# Patient Record
Sex: FEMALE | Race: WHITE
[De-identification: names, ages, dates, MRNs, and addresses within clinical notes are randomized per-mention and may not be internally consistent; named-entity substitution may affect disease eponyms.]

---

## 2019-02-11 ENCOUNTER — HOSPITAL ENCOUNTER (EMERGENCY)
Dept: HOSPITAL 90 - EDH | Age: 79
Discharge: HOME | End: 2019-02-11
Payer: MEDICARE

## 2019-02-11 DIAGNOSIS — Z79.4: ICD-10-CM

## 2019-02-11 DIAGNOSIS — E78.5: ICD-10-CM

## 2019-02-11 DIAGNOSIS — E11.9: ICD-10-CM

## 2019-02-11 DIAGNOSIS — Z95.1: ICD-10-CM

## 2019-02-11 DIAGNOSIS — I10: ICD-10-CM

## 2019-02-11 DIAGNOSIS — A08.4: Primary | ICD-10-CM

## 2019-02-11 DIAGNOSIS — I25.10: ICD-10-CM

## 2019-02-11 LAB
ALBUMIN SERPL-MCNC: 3.8 G/DL (ref 3.5–5)
ALT SERPL-CCNC: 19 U/L (ref 12–78)
AST SERPL-CCNC: 28 U/L (ref 10–37)
BASOPHILS NFR BLD AUTO: 0.7 % (ref 0–5)
BILIRUB SERPL-MCNC: 0.6 MG/DL (ref 0.2–1)
BUN SERPL-MCNC: 39 MG/DL (ref 7–18)
CHLORIDE SERPL-SCNC: 100 MMOL/L (ref 101–111)
CO2 SERPL-SCNC: 26 MMOL/L (ref 21–32)
CREAT SERPL-MCNC: 1.7 MG/DL (ref 0.5–1.5)
EOSINOPHIL NFR BLD AUTO: 1 % (ref 0–8)
ERYTHROCYTE [DISTWIDTH] IN BLOOD BY AUTOMATED COUNT: 13.3 % (ref 11–15.5)
GFR SERPL CREATININE-BSD FRML MDRD: 31 ML/MIN (ref 60–?)
GLUCOSE SERPL-MCNC: 102 MG/DL (ref 70–105)
HCT VFR BLD AUTO: 33.5 % (ref 36–48)
LYMPHOCYTES NFR SPEC AUTO: 26 % (ref 21–51)
MCH RBC QN AUTO: 30.3 PG (ref 27–33)
MCHC RBC AUTO-ENTMCNC: 33.3 G/DL (ref 32–36)
MCV RBC AUTO: 90.9 FL (ref 79–99)
MONOCYTES NFR BLD AUTO: 12.6 % (ref 3–13)
NEUTROPHILS NFR BLD AUTO: 59.7 % (ref 40–77)
NRBC BLD MANUAL-RTO: 0 % (ref 0–0.19)
PLATELET # BLD AUTO: 238 K/UL (ref 130–400)
POTASSIUM SERPL-SCNC: 4.1 MMOL/L (ref 3.5–5.1)
PROT SERPL-MCNC: 7.2 G/DL (ref 6–8.3)
RBC # BLD AUTO: 3.68 MIL/UL (ref 4–5.5)
SODIUM SERPL-SCNC: 138 MMOL/L (ref 136–145)
WBC # BLD AUTO: 7.8 K/UL (ref 4.8–10.8)

## 2019-02-11 PROCEDURE — 85025 COMPLETE CBC W/AUTO DIFF WBC: CPT

## 2019-02-11 PROCEDURE — 87804 INFLUENZA ASSAY W/OPTIC: CPT

## 2019-02-11 PROCEDURE — 80053 COMPREHEN METABOLIC PANEL: CPT

## 2019-02-11 PROCEDURE — 71045 X-RAY EXAM CHEST 1 VIEW: CPT

## 2019-02-11 PROCEDURE — 99284 EMERGENCY DEPT VISIT MOD MDM: CPT

## 2019-02-11 PROCEDURE — 96361 HYDRATE IV INFUSION ADD-ON: CPT

## 2019-02-11 PROCEDURE — 36415 COLL VENOUS BLD VENIPUNCTURE: CPT

## 2019-02-11 PROCEDURE — 96374 THER/PROPH/DIAG INJ IV PUSH: CPT

## 2019-07-17 ENCOUNTER — HOSPITAL ENCOUNTER (OUTPATIENT)
Dept: HOSPITAL 90 - RAH | Age: 79
Discharge: HOME | End: 2019-07-17
Attending: INTERNAL MEDICINE
Payer: MEDICARE

## 2019-07-17 DIAGNOSIS — R16.0: Primary | ICD-10-CM

## 2019-07-17 PROCEDURE — 76700 US EXAM ABDOM COMPLETE: CPT

## 2019-09-14 ENCOUNTER — HOSPITAL ENCOUNTER (INPATIENT)
Dept: HOSPITAL 90 - EDH | Age: 79
LOS: 6 days | Discharge: HOME | End: 2019-09-20
Payer: MEDICARE

## 2019-09-14 DIAGNOSIS — N17.9: ICD-10-CM

## 2019-09-14 DIAGNOSIS — I50.32: ICD-10-CM

## 2019-09-14 DIAGNOSIS — N18.4: ICD-10-CM

## 2019-09-14 DIAGNOSIS — I13.0: ICD-10-CM

## 2019-09-14 DIAGNOSIS — I25.110: Primary | ICD-10-CM

## 2019-09-14 DIAGNOSIS — I25.10: ICD-10-CM

## 2019-09-14 DIAGNOSIS — I48.0: ICD-10-CM

## 2019-10-22 ENCOUNTER — HOSPITAL ENCOUNTER (INPATIENT)
Dept: HOSPITAL 90 - EDH | Age: 79
LOS: 2 days | Discharge: HOME | DRG: 287 | End: 2019-10-24
Attending: INTERNAL MEDICINE | Admitting: INTERNAL MEDICINE
Payer: MEDICARE

## 2019-10-22 VITALS — BODY MASS INDEX: 21.49 KG/M2 | HEIGHT: 64 IN | WEIGHT: 125.9 LBS

## 2019-10-22 DIAGNOSIS — I13.0: ICD-10-CM

## 2019-10-22 DIAGNOSIS — Z79.01: ICD-10-CM

## 2019-10-22 DIAGNOSIS — Z95.5: ICD-10-CM

## 2019-10-22 DIAGNOSIS — E78.5: ICD-10-CM

## 2019-10-22 DIAGNOSIS — Z95.1: ICD-10-CM

## 2019-10-22 DIAGNOSIS — I50.9: ICD-10-CM

## 2019-10-22 DIAGNOSIS — Z95.0: ICD-10-CM

## 2019-10-22 DIAGNOSIS — E11.22: ICD-10-CM

## 2019-10-22 DIAGNOSIS — I48.91: ICD-10-CM

## 2019-10-22 DIAGNOSIS — Z88.8: ICD-10-CM

## 2019-10-22 DIAGNOSIS — I24.9: ICD-10-CM

## 2019-10-22 DIAGNOSIS — Z88.1: ICD-10-CM

## 2019-10-22 DIAGNOSIS — Z88.6: ICD-10-CM

## 2019-10-22 DIAGNOSIS — Z88.7: ICD-10-CM

## 2019-10-22 DIAGNOSIS — E11.21: ICD-10-CM

## 2019-10-22 DIAGNOSIS — N18.4: ICD-10-CM

## 2019-10-22 DIAGNOSIS — I25.110: Primary | ICD-10-CM

## 2019-10-22 DIAGNOSIS — Z88.0: ICD-10-CM

## 2019-10-22 LAB
ALBUMIN SERPL-MCNC: 3.9 G/DL (ref 3.5–5)
ALT SERPL-CCNC: 17 U/L (ref 12–78)
APTT PPP: 29.6 SEC (ref 26.3–35.5)
AST SERPL-CCNC: 20 U/L (ref 10–37)
BASOPHILS NFR BLD AUTO: 0.6 % (ref 0–5)
BILIRUB SERPL-MCNC: 0.5 MG/DL (ref 0.2–1)
BUN SERPL-MCNC: 41 MG/DL (ref 7–18)
CHLORIDE SERPL-SCNC: 103 MMOL/L (ref 101–111)
CK SERPL-CCNC: 103 U/L (ref 21–232)
CO2 SERPL-SCNC: 34 MMOL/L (ref 21–32)
CREAT SERPL-MCNC: 1.8 MG/DL (ref 0.5–1.5)
EOSINOPHIL NFR BLD AUTO: 1.7 % (ref 0–8)
ERYTHROCYTE [DISTWIDTH] IN BLOOD BY AUTOMATED COUNT: 14 % (ref 11–15.5)
GFR SERPL CREATININE-BSD FRML MDRD: 29 ML/MIN (ref 60–?)
GLUCOSE SERPL-MCNC: 110 MG/DL (ref 70–105)
HCT VFR BLD AUTO: 30.7 % (ref 36–48)
INR PPP: 1.15 (ref 0.85–1.15)
LYMPHOCYTES NFR SPEC AUTO: 25.9 % (ref 21–51)
MCH RBC QN AUTO: 30.7 PG (ref 27–33)
MCHC RBC AUTO-ENTMCNC: 33.9 G/DL (ref 32–36)
MCV RBC AUTO: 90.5 FL (ref 79–99)
MONOCYTES NFR BLD AUTO: 7.3 % (ref 3–13)
NEUTROPHILS NFR BLD AUTO: 64.5 % (ref 40–77)
NRBC BLD MANUAL-RTO: 0 % (ref 0–0.19)
PLATELET # BLD AUTO: 205 K/UL (ref 130–400)
POTASSIUM SERPL-SCNC: 3.7 MMOL/L (ref 3.5–5.1)
PROT SERPL-MCNC: 7.3 G/DL (ref 6–8.3)
PROTHROMBIN TIME: 12 SEC (ref 9.6–11.6)
RBC # BLD AUTO: 3.4 MIL/UL (ref 4–5.5)
SODIUM SERPL-SCNC: 143 MMOL/L (ref 136–145)
WBC # BLD AUTO: 10.2 K/UL (ref 4.8–10.8)

## 2019-10-22 PROCEDURE — 83735 ASSAY OF MAGNESIUM: CPT

## 2019-10-22 PROCEDURE — 36415 COLL VENOUS BLD VENIPUNCTURE: CPT

## 2019-10-22 PROCEDURE — 81001 URINALYSIS AUTO W/SCOPE: CPT

## 2019-10-22 PROCEDURE — 80162 ASSAY OF DIGOXIN TOTAL: CPT

## 2019-10-22 PROCEDURE — 80053 COMPREHEN METABOLIC PANEL: CPT

## 2019-10-22 PROCEDURE — 93459 L HRT ART/GRFT ANGIO: CPT

## 2019-10-22 PROCEDURE — 84550 ASSAY OF BLOOD/URIC ACID: CPT

## 2019-10-22 PROCEDURE — 93005 ELECTROCARDIOGRAM TRACING: CPT

## 2019-10-22 PROCEDURE — 85730 THROMBOPLASTIN TIME PARTIAL: CPT

## 2019-10-22 PROCEDURE — 83540 ASSAY OF IRON: CPT

## 2019-10-22 PROCEDURE — 83550 IRON BINDING TEST: CPT

## 2019-10-22 PROCEDURE — 85027 COMPLETE CBC AUTOMATED: CPT

## 2019-10-22 PROCEDURE — 80048 BASIC METABOLIC PNL TOTAL CA: CPT

## 2019-10-22 PROCEDURE — 85025 COMPLETE CBC W/AUTO DIFF WBC: CPT

## 2019-10-22 PROCEDURE — 84100 ASSAY OF PHOSPHORUS: CPT

## 2019-10-22 PROCEDURE — 82550 ASSAY OF CK (CPK): CPT

## 2019-10-22 PROCEDURE — 76770 US EXAM ABDO BACK WALL COMP: CPT

## 2019-10-22 PROCEDURE — 83874 ASSAY OF MYOGLOBIN: CPT

## 2019-10-22 PROCEDURE — 71045 X-RAY EXAM CHEST 1 VIEW: CPT

## 2019-10-22 PROCEDURE — 84484 ASSAY OF TROPONIN QUANT: CPT

## 2019-10-22 PROCEDURE — 82948 REAGENT STRIP/BLOOD GLUCOSE: CPT

## 2019-10-22 PROCEDURE — 85610 PROTHROMBIN TIME: CPT

## 2019-10-23 VITALS — DIASTOLIC BLOOD PRESSURE: 64 MMHG | SYSTOLIC BLOOD PRESSURE: 141 MMHG

## 2019-10-23 VITALS — DIASTOLIC BLOOD PRESSURE: 53 MMHG | SYSTOLIC BLOOD PRESSURE: 148 MMHG

## 2019-10-23 VITALS — DIASTOLIC BLOOD PRESSURE: 52 MMHG | SYSTOLIC BLOOD PRESSURE: 126 MMHG

## 2019-10-23 VITALS — SYSTOLIC BLOOD PRESSURE: 142 MMHG | DIASTOLIC BLOOD PRESSURE: 65 MMHG

## 2019-10-23 VITALS — DIASTOLIC BLOOD PRESSURE: 63 MMHG | SYSTOLIC BLOOD PRESSURE: 132 MMHG

## 2019-10-23 LAB
BUN SERPL-MCNC: 37 MG/DL (ref 7–18)
CHLORIDE SERPL-SCNC: 102 MMOL/L (ref 101–111)
CK SERPL-CCNC: 80 U/L (ref 21–232)
CK SERPL-CCNC: 90 U/L (ref 21–232)
CO2 SERPL-SCNC: 32 MMOL/L (ref 21–32)
CREAT SERPL-MCNC: 1.5 MG/DL (ref 0.5–1.5)
ERYTHROCYTE [DISTWIDTH] IN BLOOD BY AUTOMATED COUNT: 14.4 % (ref 11–15.5)
GFR SERPL CREATININE-BSD FRML MDRD: 36 ML/MIN (ref 60–?)
GLUCOSE SERPL-MCNC: 114 MG/DL (ref 70–105)
HCT VFR BLD AUTO: 31.2 % (ref 36–48)
IRON SATN MFR SERPL: 15.5 % (ref 22–44)
IRON SERPL-MCNC: 52 MCG/DL (ref 50–170)
MAGNESIUM SERPL-MCNC: 2.2 MG/DL (ref 1.8–2.4)
MCH RBC QN AUTO: 30 PG (ref 27–33)
MCHC RBC AUTO-ENTMCNC: 33.5 G/DL (ref 32–36)
MCV RBC AUTO: 89.4 FL (ref 79–99)
MYOGLOBIN SERPL-MCNC: 75 NG/ML (ref 10–92)
MYOGLOBIN SERPL-MCNC: 79 NG/ML (ref 10–92)
NRBC BLD MANUAL-RTO: 0 % (ref 0–0.19)
PHOSPHATE SERPL-MCNC: 3.6 MG/DL (ref 2.5–4.9)
PLATELET # BLD AUTO: 232 K/UL (ref 130–400)
POTASSIUM SERPL-SCNC: 3.9 MMOL/L (ref 3.5–5.1)
RBC # BLD AUTO: 3.49 MIL/UL (ref 4–5.5)
SODIUM SERPL-SCNC: 141 MMOL/L (ref 136–145)
TIBC SERPL-MCNC: 335 MCG/DL (ref 250–450)
TROPONIN I SERPL-MCNC: < 0.04 NG/ML (ref 0–0.06)
TROPONIN I SERPL-MCNC: < 0.04 NG/ML (ref 0–0.06)
URATE SERPL-MCNC: 9.2 MG/DL (ref 2.6–7.2)
WBC # BLD AUTO: 8.1 K/UL (ref 4.8–10.8)

## 2019-10-23 RX ADMIN — SODIUM CHLORIDE SCH UNIT: 9 INJECTION, SOLUTION INTRAVENOUS at 17:15

## 2019-10-23 RX ADMIN — SODIUM CHLORIDE SCH UNIT: 9 INJECTION, SOLUTION INTRAVENOUS at 12:00

## 2019-10-23 RX ADMIN — Medication SCH TAB: at 09:16

## 2019-10-23 RX ADMIN — NITROGLYCERIN SCH INCH: 20 OINTMENT TOPICAL at 11:27

## 2019-10-23 RX ADMIN — CARVEDILOL SCH MG: 3.12 TABLET, FILM COATED ORAL at 09:19

## 2019-10-23 RX ADMIN — BUMETANIDE SCH MG: 1 TABLET ORAL at 09:17

## 2019-10-23 RX ADMIN — CARVEDILOL SCH MG: 3.12 TABLET, FILM COATED ORAL at 19:56

## 2019-10-23 RX ADMIN — SODIUM CHLORIDE SCH UNIT: 9 INJECTION, SOLUTION INTRAVENOUS at 08:00

## 2019-10-23 RX ADMIN — PANTOPRAZOLE SODIUM SCH MG: 40 TABLET, DELAYED RELEASE ORAL at 09:17

## 2019-10-23 RX ADMIN — NITROGLYCERIN SCH INCH: 20 OINTMENT TOPICAL at 09:00

## 2019-10-23 RX ADMIN — Medication SCH MG: at 19:55

## 2019-10-23 RX ADMIN — SODIUM CHLORIDE SCH MLS/HR: 0.9 INJECTION, SOLUTION INTRAVENOUS at 09:30

## 2019-10-23 RX ADMIN — POLYVINYL ALCOHOL SCH DROP: 14 SOLUTION/ DROPS OPHTHALMIC at 09:00

## 2019-10-23 RX ADMIN — CLOPIDOGREL BISULFATE SCH MG: 75 TABLET, FILM COATED ORAL at 09:18

## 2019-10-23 RX ADMIN — POLYVINYL ALCOHOL SCH DROP: 14 SOLUTION/ DROPS OPHTHALMIC at 14:00

## 2019-10-23 RX ADMIN — SODIUM CHLORIDE SCH MLS/HR: 0.9 INJECTION, SOLUTION INTRAVENOUS at 19:05

## 2019-10-23 RX ADMIN — Medication SCH EACH: at 09:16

## 2019-10-23 RX ADMIN — Medication SCH MG: at 09:17

## 2019-10-23 RX ADMIN — DIGOXIN SCH MCG: 125 TABLET ORAL at 09:17

## 2019-10-23 RX ADMIN — NITROGLYCERIN SCH INCH: 20 OINTMENT TOPICAL at 19:56

## 2019-10-23 RX ADMIN — SIMVASTATIN SCH MG: 20 TABLET, FILM COATED ORAL at 09:18

## 2019-10-23 RX ADMIN — POLYVINYL ALCOHOL SCH DROP: 14 SOLUTION/ DROPS OPHTHALMIC at 19:58

## 2019-10-23 RX ADMIN — CHLORTHALIDONE SCH EACH: 50 TABLET ORAL at 19:57

## 2019-10-23 RX ADMIN — CHLORTHALIDONE SCH EACH: 50 TABLET ORAL at 09:00

## 2019-10-23 RX ADMIN — PYRITHIONE ZINC SCH MG: 1 SHAMPOO TOPICAL at 09:16

## 2019-10-23 NOTE — NUR
CHEST PAIN

PATIENT COMPLAINED OF CHEST PAIN. TELEMETRY MONITOR REPORTED HR 80 V-PACED RHYTHM. PATIENT 
DESCRIBES PAIN AS A SLIGHT PRESSURE MAINLY ON THE CENTER OF HER CHEST. NITRO PATCH APPLIED 
AS PER EMAR. BP:154/75. WILL CONTINUE TO MONITOR CLOSELY.

## 2019-10-23 NOTE — NUR
DCP

CM met with pt discussed dc plans. Pt is independent prior to admission, lives at home 
alone, daughter lives close by. Denies any equipments/services. Feels safe to go back home, 
still drives, daughter able to assist with transportation and needs as necessary. Offered 
possible short term placement rehab if MD recommends, pt declined at this time, prefers to 
go back home. DC plan to home once stable. CM to cont to follow up.

-------------------------------------------------------------------------------

Addendum: 10/23/19 at 1442 by YAIR HSIEH LVN CM

-------------------------------------------------------------------------------

Amended: Links added.

## 2019-10-24 VITALS — SYSTOLIC BLOOD PRESSURE: 139 MMHG | DIASTOLIC BLOOD PRESSURE: 55 MMHG

## 2019-10-24 VITALS — DIASTOLIC BLOOD PRESSURE: 63 MMHG | SYSTOLIC BLOOD PRESSURE: 162 MMHG

## 2019-10-24 VITALS — SYSTOLIC BLOOD PRESSURE: 134 MMHG | DIASTOLIC BLOOD PRESSURE: 55 MMHG

## 2019-10-24 VITALS — DIASTOLIC BLOOD PRESSURE: 66 MMHG | SYSTOLIC BLOOD PRESSURE: 169 MMHG

## 2019-10-24 VITALS — DIASTOLIC BLOOD PRESSURE: 64 MMHG | SYSTOLIC BLOOD PRESSURE: 168 MMHG

## 2019-10-24 VITALS — DIASTOLIC BLOOD PRESSURE: 67 MMHG | SYSTOLIC BLOOD PRESSURE: 144 MMHG

## 2019-10-24 VITALS — SYSTOLIC BLOOD PRESSURE: 148 MMHG | DIASTOLIC BLOOD PRESSURE: 57 MMHG

## 2019-10-24 LAB
PH UR STRIP: 5 [PH] (ref 5–8)
RBC #/AREA URNS HPF: (no result) /HPF (ref 0–1)
SP GR UR STRIP: 1.01 (ref 1–1.03)
UROBILINOGEN UR STRIP-MCNC: 0.2 MG/DL (ref 0.2–1)
WBC #/AREA URNS HPF: (no result) /HPF (ref 0–1)

## 2019-10-24 PROCEDURE — B218YZZ FLUOROSCOPY OF LEFT INTERNAL MAMMARY BYPASS GRAFT USING OTHER CONTRAST: ICD-10-PCS | Performed by: INTERNAL MEDICINE

## 2019-10-24 PROCEDURE — 4A023N7 MEASUREMENT OF CARDIAC SAMPLING AND PRESSURE, LEFT HEART, PERCUTANEOUS APPROACH: ICD-10-PCS | Performed by: INTERNAL MEDICINE

## 2019-10-24 PROCEDURE — B2111ZZ FLUOROSCOPY OF MULTIPLE CORONARY ARTERIES USING LOW OSMOLAR CONTRAST: ICD-10-PCS | Performed by: INTERNAL MEDICINE

## 2019-10-24 PROCEDURE — B212YZZ FLUOROSCOPY OF SINGLE CORONARY ARTERY BYPASS GRAFT USING OTHER CONTRAST: ICD-10-PCS | Performed by: INTERNAL MEDICINE

## 2019-10-24 RX ADMIN — CARVEDILOL SCH MG: 3.12 TABLET, FILM COATED ORAL at 10:18

## 2019-10-24 RX ADMIN — BUMETANIDE SCH MG: 1 TABLET ORAL at 10:19

## 2019-10-24 RX ADMIN — SIMVASTATIN SCH MG: 20 TABLET, FILM COATED ORAL at 10:19

## 2019-10-24 RX ADMIN — SODIUM CHLORIDE SCH MLS/HR: 0.9 INJECTION, SOLUTION INTRAVENOUS at 03:22

## 2019-10-24 RX ADMIN — Medication SCH MG: at 10:17

## 2019-10-24 RX ADMIN — PANTOPRAZOLE SODIUM SCH MG: 40 TABLET, DELAYED RELEASE ORAL at 10:18

## 2019-10-24 RX ADMIN — CLOPIDOGREL BISULFATE SCH MG: 75 TABLET, FILM COATED ORAL at 10:19

## 2019-10-24 RX ADMIN — CHLORTHALIDONE SCH EACH: 50 TABLET ORAL at 09:00

## 2019-10-24 RX ADMIN — DIGOXIN SCH MCG: 125 TABLET ORAL at 10:19

## 2019-10-24 RX ADMIN — NITROGLYCERIN SCH INCH: 20 OINTMENT TOPICAL at 03:21

## 2019-10-24 RX ADMIN — POLYVINYL ALCOHOL SCH DROP: 14 SOLUTION/ DROPS OPHTHALMIC at 14:00

## 2019-10-24 RX ADMIN — SODIUM CHLORIDE SCH MLS/HR: 0.9 INJECTION, SOLUTION INTRAVENOUS at 13:45

## 2019-10-24 RX ADMIN — POLYVINYL ALCOHOL SCH DROP: 14 SOLUTION/ DROPS OPHTHALMIC at 09:00

## 2019-10-24 RX ADMIN — SODIUM CHLORIDE SCH UNIT: 9 INJECTION, SOLUTION INTRAVENOUS at 11:51

## 2019-10-24 RX ADMIN — Medication SCH EACH: at 10:17

## 2019-10-24 RX ADMIN — SODIUM CHLORIDE SCH UNIT: 9 INJECTION, SOLUTION INTRAVENOUS at 08:00

## 2019-10-24 RX ADMIN — NITROGLYCERIN SCH INCH: 20 OINTMENT TOPICAL at 09:11

## 2019-10-24 RX ADMIN — NITROGLYCERIN SCH INCH: 20 OINTMENT TOPICAL at 15:11

## 2019-10-24 RX ADMIN — PYRITHIONE ZINC SCH MG: 1 SHAMPOO TOPICAL at 10:18

## 2019-10-24 RX ADMIN — Medication SCH TAB: at 10:18

## 2019-10-24 NOTE — NUR
DISCHARGE

PATIENT GIVEN DISCHARGE INSTRUCTIONS AND EDUCATION ON FOLLOW UP APPOINTMENTS AND ANGINA. 
PATIENT VERBALIZED UNDERSTANDING OF ALL EDUCATION GIVEN VIA TEACHBACK. NO CONCERNS VOICED. 
IV DISCONTINUED, CATHETER INTACT. PRESSURE APPLIED TO PREVENT BLEEDING. PATIENT LEFT VIA 
WHEELCHAIR, NURSE AT SIDE. NO DISTRESS NOTED UPON DISCHARGE. ALL BELONGINGS TAKEN WITH.

## 2019-10-24 NOTE — NUR
PATIENT BACK FROM CATH LAB. RIGHT GROIN ASSESSED. NO HEMATOMA OR BLEEDING NOTED. BILATERAL 
PEDAL PULSES PAPABLE. 

SITE ASSESSED Q15 MIN X1 HOUR NO CHANGES

ASSESSED AGAIN Q30 MINX1 HOUR NO CHANGED

ASSESSED Q1HOUR UNTIL DISCHARGE WITH NO CHANGES, NO HEMATOMA, NO TENDERNESS OR BLEEDING. NO 
C/O NUMBNESS.

## 2019-11-07 ENCOUNTER — HOSPITAL ENCOUNTER (INPATIENT)
Dept: HOSPITAL 90 - EDH | Age: 79
LOS: 5 days | Discharge: HOME | DRG: 253 | End: 2019-11-12
Attending: INTERNAL MEDICINE | Admitting: INTERNAL MEDICINE
Payer: MEDICARE

## 2019-11-07 ENCOUNTER — HOSPITAL ENCOUNTER (OUTPATIENT)
Dept: HOSPITAL 90 - RAH | Age: 79
Discharge: HOME | End: 2019-11-07
Attending: INTERNAL MEDICINE
Payer: MEDICARE

## 2019-11-07 VITALS — SYSTOLIC BLOOD PRESSURE: 153 MMHG | DIASTOLIC BLOOD PRESSURE: 68 MMHG

## 2019-11-07 VITALS — BODY MASS INDEX: 21.44 KG/M2 | HEIGHT: 64 IN | WEIGHT: 125.6 LBS

## 2019-11-07 DIAGNOSIS — N18.4: ICD-10-CM

## 2019-11-07 DIAGNOSIS — Z88.8: ICD-10-CM

## 2019-11-07 DIAGNOSIS — Q27.8: ICD-10-CM

## 2019-11-07 DIAGNOSIS — E78.5: ICD-10-CM

## 2019-11-07 DIAGNOSIS — I72.4: Primary | ICD-10-CM

## 2019-11-07 DIAGNOSIS — I48.20: ICD-10-CM

## 2019-11-07 DIAGNOSIS — Z80.1: ICD-10-CM

## 2019-11-07 DIAGNOSIS — Z79.01: ICD-10-CM

## 2019-11-07 DIAGNOSIS — Z95.5: ICD-10-CM

## 2019-11-07 DIAGNOSIS — I48.0: ICD-10-CM

## 2019-11-07 DIAGNOSIS — Z88.1: ICD-10-CM

## 2019-11-07 DIAGNOSIS — E11.22: ICD-10-CM

## 2019-11-07 DIAGNOSIS — I50.32: ICD-10-CM

## 2019-11-07 DIAGNOSIS — I25.10: ICD-10-CM

## 2019-11-07 DIAGNOSIS — Z88.7: ICD-10-CM

## 2019-11-07 DIAGNOSIS — Z79.02: ICD-10-CM

## 2019-11-07 DIAGNOSIS — Z88.0: ICD-10-CM

## 2019-11-07 DIAGNOSIS — Z95.1: ICD-10-CM

## 2019-11-07 DIAGNOSIS — I13.0: ICD-10-CM

## 2019-11-07 DIAGNOSIS — Z79.4: ICD-10-CM

## 2019-11-07 DIAGNOSIS — D64.9: ICD-10-CM

## 2019-11-07 DIAGNOSIS — Z95.0: ICD-10-CM

## 2019-11-07 LAB
ALBUMIN SERPL-MCNC: 4.1 G/DL (ref 3.5–5)
ALT SERPL-CCNC: 19 U/L (ref 12–78)
APTT PPP: 31.5 SEC (ref 26.3–35.5)
AST SERPL-CCNC: 21 U/L (ref 10–37)
BASOPHILS NFR BLD AUTO: 1 % (ref 0–5)
BILIRUB SERPL-MCNC: 0.6 MG/DL (ref 0.2–1)
BNP SERPL-MCNC: 274 PG/ML (ref 0–100)
BUN SERPL-MCNC: 32 MG/DL (ref 7–18)
CHLORIDE SERPL-SCNC: 101 MMOL/L (ref 101–111)
CK SERPL-CCNC: 95 U/L (ref 21–232)
CO2 SERPL-SCNC: 35 MMOL/L (ref 21–32)
CREAT SERPL-MCNC: 1.5 MG/DL (ref 0.5–1.5)
EOSINOPHIL NFR BLD AUTO: 1.6 % (ref 0–8)
ERYTHROCYTE [DISTWIDTH] IN BLOOD BY AUTOMATED COUNT: 14 % (ref 11–15.5)
GFR SERPL CREATININE-BSD FRML MDRD: 36 ML/MIN (ref 60–?)
GLUCOSE SERPL-MCNC: 90 MG/DL (ref 70–105)
HCT VFR BLD AUTO: 33.4 % (ref 36–48)
INR PPP: 1.11 (ref 0.85–1.15)
LYMPHOCYTES NFR SPEC AUTO: 26.3 % (ref 21–51)
MCH RBC QN AUTO: 29.7 PG (ref 27–33)
MCHC RBC AUTO-ENTMCNC: 33.2 G/DL (ref 32–36)
MCV RBC AUTO: 89.5 FL (ref 79–99)
MONOCYTES NFR BLD AUTO: 7.2 % (ref 3–13)
NEUTROPHILS NFR BLD AUTO: 63.9 % (ref 40–77)
NRBC BLD MANUAL-RTO: 0 % (ref 0–0.19)
PH UR STRIP: 8 [PH] (ref 5–8)
PLATELET # BLD AUTO: 298 K/UL (ref 130–400)
POTASSIUM SERPL-SCNC: 4.2 MMOL/L (ref 3.5–5.1)
PROT SERPL-MCNC: 7.8 G/DL (ref 6–8.3)
PROTHROMBIN TIME: 11.6 SEC (ref 9.6–11.6)
RBC # BLD AUTO: 3.74 MIL/UL (ref 4–5.5)
SODIUM SERPL-SCNC: 143 MMOL/L (ref 136–145)
SP GR UR STRIP: 1.01 (ref 1–1.03)
UROBILINOGEN UR STRIP-MCNC: 0.2 MG/DL (ref 0.2–1)
WBC # BLD AUTO: 9 K/UL (ref 4.8–10.8)

## 2019-11-07 PROCEDURE — 85610 PROTHROMBIN TIME: CPT

## 2019-11-07 PROCEDURE — 82948 REAGENT STRIP/BLOOD GLUCOSE: CPT

## 2019-11-07 PROCEDURE — 76882 US LMTD JT/FCL EVL NVASC XTR: CPT

## 2019-11-07 PROCEDURE — 36415 COLL VENOUS BLD VENIPUNCTURE: CPT

## 2019-11-07 PROCEDURE — 86922 COMPATIBILITY TEST ANTIGLOB: CPT

## 2019-11-07 PROCEDURE — 83880 ASSAY OF NATRIURETIC PEPTIDE: CPT

## 2019-11-07 PROCEDURE — 84484 ASSAY OF TROPONIN QUANT: CPT

## 2019-11-07 PROCEDURE — 86901 BLOOD TYPING SEROLOGIC RH(D): CPT

## 2019-11-07 PROCEDURE — 80048 BASIC METABOLIC PNL TOTAL CA: CPT

## 2019-11-07 PROCEDURE — 80053 COMPREHEN METABOLIC PANEL: CPT

## 2019-11-07 PROCEDURE — 82550 ASSAY OF CK (CPK): CPT

## 2019-11-07 PROCEDURE — 86850 RBC ANTIBODY SCREEN: CPT

## 2019-11-07 PROCEDURE — 85730 THROMBOPLASTIN TIME PARTIAL: CPT

## 2019-11-07 PROCEDURE — 93005 ELECTROCARDIOGRAM TRACING: CPT

## 2019-11-07 PROCEDURE — 86900 BLOOD TYPING SEROLOGIC ABO: CPT

## 2019-11-07 PROCEDURE — 81003 URINALYSIS AUTO W/O SCOPE: CPT

## 2019-11-07 PROCEDURE — 71045 X-RAY EXAM CHEST 1 VIEW: CPT

## 2019-11-07 PROCEDURE — 85025 COMPLETE CBC W/AUTO DIFF WBC: CPT

## 2019-11-07 PROCEDURE — 85027 COMPLETE CBC AUTOMATED: CPT

## 2019-11-08 VITALS — SYSTOLIC BLOOD PRESSURE: 153 MMHG | DIASTOLIC BLOOD PRESSURE: 67 MMHG

## 2019-11-08 VITALS — DIASTOLIC BLOOD PRESSURE: 65 MMHG | SYSTOLIC BLOOD PRESSURE: 163 MMHG

## 2019-11-08 VITALS — DIASTOLIC BLOOD PRESSURE: 64 MMHG | SYSTOLIC BLOOD PRESSURE: 133 MMHG

## 2019-11-08 VITALS — SYSTOLIC BLOOD PRESSURE: 142 MMHG | DIASTOLIC BLOOD PRESSURE: 54 MMHG

## 2019-11-08 VITALS — DIASTOLIC BLOOD PRESSURE: 65 MMHG | SYSTOLIC BLOOD PRESSURE: 157 MMHG

## 2019-11-08 LAB
ALBUMIN SERPL-MCNC: 3.5 G/DL (ref 3.5–5)
ALT SERPL-CCNC: 15 U/L (ref 12–78)
AST SERPL-CCNC: 19 U/L (ref 10–37)
BASOPHILS NFR BLD AUTO: 1.2 % (ref 0–5)
BILIRUB SERPL-MCNC: 0.7 MG/DL (ref 0.2–1)
BUN SERPL-MCNC: 29 MG/DL (ref 7–18)
CHLORIDE SERPL-SCNC: 103 MMOL/L (ref 101–111)
CO2 SERPL-SCNC: 35 MMOL/L (ref 21–32)
CREAT SERPL-MCNC: 1.5 MG/DL (ref 0.5–1.5)
EOSINOPHIL NFR BLD AUTO: 1.6 % (ref 0–8)
ERYTHROCYTE [DISTWIDTH] IN BLOOD BY AUTOMATED COUNT: 13.9 % (ref 11–15.5)
GFR SERPL CREATININE-BSD FRML MDRD: 36 ML/MIN (ref 60–?)
GLUCOSE SERPL-MCNC: 101 MG/DL (ref 70–105)
HCT VFR BLD AUTO: 33.5 % (ref 36–48)
LYMPHOCYTES NFR SPEC AUTO: 25.6 % (ref 21–51)
MCH RBC QN AUTO: 29.8 PG (ref 27–33)
MCHC RBC AUTO-ENTMCNC: 33.1 G/DL (ref 32–36)
MCV RBC AUTO: 90.3 FL (ref 79–99)
MONOCYTES NFR BLD AUTO: 7.5 % (ref 3–13)
NEUTROPHILS NFR BLD AUTO: 64.1 % (ref 40–77)
NRBC BLD MANUAL-RTO: 0 % (ref 0–0.19)
PLATELET # BLD AUTO: 263 K/UL (ref 130–400)
POTASSIUM SERPL-SCNC: 3.8 MMOL/L (ref 3.5–5.1)
PROT SERPL-MCNC: 6.9 G/DL (ref 6–8.3)
RBC # BLD AUTO: 3.72 MIL/UL (ref 4–5.5)
SODIUM SERPL-SCNC: 143 MMOL/L (ref 136–145)
WBC # BLD AUTO: 7.8 K/UL (ref 4.8–10.8)

## 2019-11-08 RX ADMIN — FAMOTIDINE SCH MG: 10 INJECTION INTRAVENOUS at 09:00

## 2019-11-08 NOTE — NUR
DCP

CM met with pt discussed dc plans. Pt is independent prior to admission, lives at home 
alone, family lives close by, son lives [place]. Pt has a walker, shower chair, wheelchair, 
cpap. Pt states she used to have O2 but the Dr told her she no longer needs it and was 
returned to [facility]. Patient states she has 3 children w/POA #1dauter Ciara Gramajo [phone], #2 son Peter [phone], #3 Alexx Gramajo [phone]. Feels 
safe to go back home, family able to assist with transportation and needs as necessary. DC 
plan to home once stable. CM to cont to follow up. 

-------------------------------------------------------------------------------

Addendum: 11/08/19 at 1221 by YAIR HSIEH LVN CM

-------------------------------------------------------------------------------

Amended: Links added.

## 2019-11-08 NOTE — NUR
NOTIFIED DR. MCCOY OF THE CONSULT HE VERBALIZED THAT HE WILL COME SEE THE PATIENT IN THE 
AFTERNOON.

## 2019-11-08 NOTE — NUR
notified dr. aguilera that ir notified me that dr. montalvo the interventional radiologist will 
not do intervention at this point, recommends repeat US of the right groin on monday 11/11/19

## 2019-11-08 NOTE — NUR
U/S PSEUDOANEURYSM REPAIR ORDERED. NOT DONE

ULTRASOUND OF THE RIGHT GROIN PERFORMED ON 11/7/19. CALLED AND SPOKE TO DR. SHELDON RESULTS 
OF ULTRASOUND GIVEN TO DR. SHELDON. DR. SHELDON STATES, "TOO RISKY TO PERFORM A 
PSEUDOANEURYSM AND REPEAT AN ULTRASOUND OF RIGHT GROIN PSEUDOANEURYSM ON MONDAY 11/11/19. 
CALLED REPORT TO APOLONIA ROBINS.

## 2019-11-09 VITALS — DIASTOLIC BLOOD PRESSURE: 65 MMHG | SYSTOLIC BLOOD PRESSURE: 157 MMHG

## 2019-11-09 VITALS — SYSTOLIC BLOOD PRESSURE: 164 MMHG | DIASTOLIC BLOOD PRESSURE: 66 MMHG

## 2019-11-09 VITALS — SYSTOLIC BLOOD PRESSURE: 155 MMHG | DIASTOLIC BLOOD PRESSURE: 62 MMHG

## 2019-11-09 VITALS — DIASTOLIC BLOOD PRESSURE: 76 MMHG | SYSTOLIC BLOOD PRESSURE: 172 MMHG

## 2019-11-09 VITALS — SYSTOLIC BLOOD PRESSURE: 147 MMHG | DIASTOLIC BLOOD PRESSURE: 63 MMHG

## 2019-11-09 VITALS — DIASTOLIC BLOOD PRESSURE: 64 MMHG | SYSTOLIC BLOOD PRESSURE: 148 MMHG

## 2019-11-09 VITALS — SYSTOLIC BLOOD PRESSURE: 144 MMHG | DIASTOLIC BLOOD PRESSURE: 67 MMHG

## 2019-11-09 VITALS — DIASTOLIC BLOOD PRESSURE: 56 MMHG | SYSTOLIC BLOOD PRESSURE: 161 MMHG

## 2019-11-09 VITALS — SYSTOLIC BLOOD PRESSURE: 152 MMHG | DIASTOLIC BLOOD PRESSURE: 60 MMHG

## 2019-11-09 VITALS — DIASTOLIC BLOOD PRESSURE: 56 MMHG | SYSTOLIC BLOOD PRESSURE: 143 MMHG

## 2019-11-09 RX ADMIN — DIGOXIN SCH MCG: 125 TABLET ORAL at 17:34

## 2019-11-09 RX ADMIN — SIMVASTATIN SCH MG: 20 TABLET, FILM COATED ORAL at 20:45

## 2019-11-09 RX ADMIN — CARVEDILOL SCH MG: 3.12 TABLET, FILM COATED ORAL at 20:46

## 2019-11-09 RX ADMIN — Medication SCH MG: at 20:45

## 2019-11-09 RX ADMIN — Medication SCH TAB: at 17:00

## 2019-11-09 RX ADMIN — CHLORTHALIDONE SCH EACH: 50 TABLET ORAL at 14:00

## 2019-11-09 RX ADMIN — INSULIN GLARGINE SCH UNITS: 100 INJECTION, SOLUTION SUBCUTANEOUS at 17:24

## 2019-11-09 RX ADMIN — FAMOTIDINE SCH MG: 10 INJECTION INTRAVENOUS at 09:11

## 2019-11-09 RX ADMIN — CHLORTHALIDONE SCH EACH: 50 TABLET ORAL at 20:49

## 2019-11-09 RX ADMIN — ACETAMINOPHEN PRN MG: 500 TABLET, COATED ORAL at 13:31

## 2019-11-09 RX ADMIN — INSULIN GLARGINE SCH UNITS: 100 INJECTION, SOLUTION SUBCUTANEOUS at 17:28

## 2019-11-09 NOTE — NUR
PATIENT UPDATE

No complaints of rt groin pain overnight, no chest pain, no shortness of breath. 
Anticoagulants on hold as per the order of the cardiologist, pending repeat ultrasound 
Monday to reassess the status. Pt voiced out need to take her meds back, will remind md 
today on rounds.

## 2019-11-10 VITALS — DIASTOLIC BLOOD PRESSURE: 58 MMHG | SYSTOLIC BLOOD PRESSURE: 116 MMHG

## 2019-11-10 VITALS — DIASTOLIC BLOOD PRESSURE: 60 MMHG | SYSTOLIC BLOOD PRESSURE: 134 MMHG

## 2019-11-10 VITALS — DIASTOLIC BLOOD PRESSURE: 62 MMHG | SYSTOLIC BLOOD PRESSURE: 159 MMHG

## 2019-11-10 VITALS — SYSTOLIC BLOOD PRESSURE: 137 MMHG | DIASTOLIC BLOOD PRESSURE: 59 MMHG

## 2019-11-10 VITALS — SYSTOLIC BLOOD PRESSURE: 128 MMHG | DIASTOLIC BLOOD PRESSURE: 66 MMHG

## 2019-11-10 RX ADMIN — Medication SCH TAB: at 08:00

## 2019-11-10 RX ADMIN — Medication SCH EACH: at 09:31

## 2019-11-10 RX ADMIN — CARVEDILOL SCH MG: 3.12 TABLET, FILM COATED ORAL at 22:21

## 2019-11-10 RX ADMIN — Medication SCH MG: at 22:20

## 2019-11-10 RX ADMIN — PYRITHIONE ZINC SCH MG: 1 SHAMPOO TOPICAL at 08:18

## 2019-11-10 RX ADMIN — CHLORTHALIDONE SCH EACH: 50 TABLET ORAL at 21:00

## 2019-11-10 RX ADMIN — SIMVASTATIN SCH MG: 20 TABLET, FILM COATED ORAL at 22:20

## 2019-11-10 RX ADMIN — CHLORTHALIDONE SCH EACH: 50 TABLET ORAL at 09:00

## 2019-11-10 RX ADMIN — DIGOXIN SCH MCG: 125 TABLET ORAL at 17:01

## 2019-11-10 RX ADMIN — CARVEDILOL SCH MG: 3.12 TABLET, FILM COATED ORAL at 09:31

## 2019-11-10 RX ADMIN — BUMETANIDE SCH MG: 1 TABLET ORAL at 08:17

## 2019-11-10 RX ADMIN — BUMETANIDE SCH MG: 1 TABLET ORAL at 17:00

## 2019-11-10 RX ADMIN — FAMOTIDINE SCH MG: 10 INJECTION INTRAVENOUS at 08:19

## 2019-11-10 RX ADMIN — Medication SCH MG: at 08:18

## 2019-11-10 RX ADMIN — Medication SCH TAB: at 17:00

## 2019-11-10 RX ADMIN — INSULIN GLARGINE SCH UNITS: 100 INJECTION, SOLUTION SUBCUTANEOUS at 18:00

## 2019-11-10 RX ADMIN — Medication SCH TAB: at 08:18

## 2019-11-10 RX ADMIN — CHLORTHALIDONE SCH EACH: 50 TABLET ORAL at 14:00

## 2019-11-10 RX ADMIN — Medication SCH GM: at 22:21

## 2019-11-10 RX ADMIN — ACETAMINOPHEN PRN MG: 500 TABLET, COATED ORAL at 11:48

## 2019-11-10 NOTE — NUR
DISCUSSED DISPOSTION WITH APOLONIA

 WHO STATES PT IS IND; APPEARS SAFE TO DC TO HOME.  [place] SMITH CONSULT TODAY FOR PSUEDO 
ANEURYSM

-------------------------------------------------------------------------------

Addendum: 11/10/19 at 1555 by BJORN GUTIERREZ RN CM

-------------------------------------------------------------------------------

Amended: Links added.

## 2019-11-11 VITALS — SYSTOLIC BLOOD PRESSURE: 145 MMHG | DIASTOLIC BLOOD PRESSURE: 51 MMHG

## 2019-11-11 VITALS — DIASTOLIC BLOOD PRESSURE: 47 MMHG | SYSTOLIC BLOOD PRESSURE: 128 MMHG

## 2019-11-11 VITALS — DIASTOLIC BLOOD PRESSURE: 59 MMHG | SYSTOLIC BLOOD PRESSURE: 148 MMHG

## 2019-11-11 VITALS — SYSTOLIC BLOOD PRESSURE: 132 MMHG | DIASTOLIC BLOOD PRESSURE: 48 MMHG

## 2019-11-11 VITALS — DIASTOLIC BLOOD PRESSURE: 48 MMHG | SYSTOLIC BLOOD PRESSURE: 126 MMHG

## 2019-11-11 VITALS — SYSTOLIC BLOOD PRESSURE: 138 MMHG | DIASTOLIC BLOOD PRESSURE: 54 MMHG

## 2019-11-11 VITALS — SYSTOLIC BLOOD PRESSURE: 147 MMHG | DIASTOLIC BLOOD PRESSURE: 61 MMHG

## 2019-11-11 VITALS — DIASTOLIC BLOOD PRESSURE: 60 MMHG | SYSTOLIC BLOOD PRESSURE: 134 MMHG

## 2019-11-11 VITALS — DIASTOLIC BLOOD PRESSURE: 48 MMHG | SYSTOLIC BLOOD PRESSURE: 133 MMHG

## 2019-11-11 VITALS — SYSTOLIC BLOOD PRESSURE: 153 MMHG | DIASTOLIC BLOOD PRESSURE: 76 MMHG

## 2019-11-11 VITALS — DIASTOLIC BLOOD PRESSURE: 57 MMHG | SYSTOLIC BLOOD PRESSURE: 145 MMHG

## 2019-11-11 VITALS — SYSTOLIC BLOOD PRESSURE: 139 MMHG | DIASTOLIC BLOOD PRESSURE: 52 MMHG

## 2019-11-11 VITALS — SYSTOLIC BLOOD PRESSURE: 135 MMHG | DIASTOLIC BLOOD PRESSURE: 58 MMHG

## 2019-11-11 VITALS — SYSTOLIC BLOOD PRESSURE: 137 MMHG | DIASTOLIC BLOOD PRESSURE: 54 MMHG

## 2019-11-11 VITALS — SYSTOLIC BLOOD PRESSURE: 143 MMHG | DIASTOLIC BLOOD PRESSURE: 53 MMHG

## 2019-11-11 VITALS — DIASTOLIC BLOOD PRESSURE: 53 MMHG | SYSTOLIC BLOOD PRESSURE: 136 MMHG

## 2019-11-11 VITALS — DIASTOLIC BLOOD PRESSURE: 45 MMHG | SYSTOLIC BLOOD PRESSURE: 126 MMHG

## 2019-11-11 VITALS — SYSTOLIC BLOOD PRESSURE: 134 MMHG | DIASTOLIC BLOOD PRESSURE: 61 MMHG

## 2019-11-11 VITALS — DIASTOLIC BLOOD PRESSURE: 60 MMHG | SYSTOLIC BLOOD PRESSURE: 136 MMHG

## 2019-11-11 VITALS — DIASTOLIC BLOOD PRESSURE: 56 MMHG | SYSTOLIC BLOOD PRESSURE: 139 MMHG

## 2019-11-11 VITALS — DIASTOLIC BLOOD PRESSURE: 50 MMHG | SYSTOLIC BLOOD PRESSURE: 121 MMHG

## 2019-11-11 VITALS — SYSTOLIC BLOOD PRESSURE: 127 MMHG | DIASTOLIC BLOOD PRESSURE: 53 MMHG

## 2019-11-11 VITALS — DIASTOLIC BLOOD PRESSURE: 49 MMHG | SYSTOLIC BLOOD PRESSURE: 109 MMHG

## 2019-11-11 LAB
BASOPHILS NFR BLD AUTO: 0.4 % (ref 0–5)
BUN SERPL-MCNC: 26 MG/DL (ref 7–18)
CHLORIDE SERPL-SCNC: 102 MMOL/L (ref 101–111)
CO2 SERPL-SCNC: 32 MMOL/L (ref 21–32)
CREAT SERPL-MCNC: 1.5 MG/DL (ref 0.5–1.5)
EOSINOPHIL NFR BLD AUTO: 0.5 % (ref 0–8)
ERYTHROCYTE [DISTWIDTH] IN BLOOD BY AUTOMATED COUNT: 13.7 % (ref 11–15.5)
GFR SERPL CREATININE-BSD FRML MDRD: 36 ML/MIN (ref 60–?)
GLUCOSE SERPL-MCNC: 122 MG/DL (ref 70–105)
HCT VFR BLD AUTO: 27.8 % (ref 36–48)
LYMPHOCYTES NFR SPEC AUTO: 17.4 % (ref 21–51)
MCH RBC QN AUTO: 30.3 PG (ref 27–33)
MCHC RBC AUTO-ENTMCNC: 33.8 G/DL (ref 32–36)
MCV RBC AUTO: 89.8 FL (ref 79–99)
MONOCYTES NFR BLD AUTO: 10.9 % (ref 3–13)
NEUTROPHILS NFR BLD AUTO: 70.8 % (ref 40–77)
NRBC BLD MANUAL-RTO: 0 % (ref 0–0.19)
PLATELET # BLD AUTO: 221 K/UL (ref 130–400)
POTASSIUM SERPL-SCNC: 3.3 MMOL/L (ref 3.5–5.1)
RBC # BLD AUTO: 3.1 MIL/UL (ref 4–5.5)
SODIUM SERPL-SCNC: 141 MMOL/L (ref 136–145)
WBC # BLD AUTO: 11.6 K/UL (ref 4.8–10.8)

## 2019-11-11 PROCEDURE — 04JY0ZZ INSPECTION OF LOWER ARTERY, OPEN APPROACH: ICD-10-PCS | Performed by: THORACIC SURGERY (CARDIOTHORACIC VASCULAR SURGERY)

## 2019-11-11 RX ADMIN — DIGOXIN SCH MCG: 125 TABLET ORAL at 18:01

## 2019-11-11 RX ADMIN — CHLORTHALIDONE SCH EACH: 50 TABLET ORAL at 09:00

## 2019-11-11 RX ADMIN — BUMETANIDE SCH MG: 1 TABLET ORAL at 18:01

## 2019-11-11 RX ADMIN — CARVEDILOL SCH MG: 3.12 TABLET, FILM COATED ORAL at 20:57

## 2019-11-11 RX ADMIN — CHLORTHALIDONE SCH EACH: 50 TABLET ORAL at 14:00

## 2019-11-11 RX ADMIN — Medication SCH MG: at 20:56

## 2019-11-11 RX ADMIN — BUMETANIDE SCH MG: 1 TABLET ORAL at 09:00

## 2019-11-11 RX ADMIN — INSULIN GLARGINE SCH UNITS: 100 INJECTION, SOLUTION SUBCUTANEOUS at 18:00

## 2019-11-11 RX ADMIN — CHLORTHALIDONE SCH EACH: 50 TABLET ORAL at 21:00

## 2019-11-11 RX ADMIN — Medication SCH TAB: at 09:00

## 2019-11-11 RX ADMIN — PYRITHIONE ZINC SCH MG: 1 SHAMPOO TOPICAL at 09:00

## 2019-11-11 RX ADMIN — Medication SCH EACH: at 07:33

## 2019-11-11 RX ADMIN — SIMVASTATIN SCH MG: 20 TABLET, FILM COATED ORAL at 20:57

## 2019-11-11 RX ADMIN — CLOPIDOGREL BISULFATE SCH MG: 75 TABLET, FILM COATED ORAL at 18:01

## 2019-11-11 RX ADMIN — CHLORTHALIDONE SCH EACH: 50 TABLET ORAL at 15:30

## 2019-11-11 RX ADMIN — CARVEDILOL SCH MG: 3.12 TABLET, FILM COATED ORAL at 09:00

## 2019-11-11 RX ADMIN — Medication SCH GM: at 09:00

## 2019-11-11 RX ADMIN — FAMOTIDINE SCH MG: 10 INJECTION INTRAVENOUS at 09:00

## 2019-11-11 RX ADMIN — Medication SCH TAB: at 07:33

## 2019-11-11 RX ADMIN — Medication SCH TAB: at 18:01

## 2019-11-11 RX ADMIN — Medication SCH MG: at 09:00

## 2019-11-11 NOTE — NUR
PULSES TO BILATERAL FEET, PALPABLE.  PT STABLE. 


-------------------------------------------------------------------------------

Addendum: 11/11/19 at 1027 by DAVID LEARY RN RN

-------------------------------------------------------------------------------

Amended: Links added.

## 2019-11-11 NOTE — NUR
SURGERY

Pt taken to holding area per OR staff,pt awake,alert and oriented.Denies chest pain or 
discomfort.Or staff made aware of pt.s K+ level,states will let anesthesia know.

## 2019-11-11 NOTE — NUR
POST PROCEDURE

RECEIVED PT FROM PACU, IN SEMI BOYKIN'S POSITION, ASLEEP BUT AROUSEABLE, A&OX3, CALM 
COOPERATIVE AND DOES NOT APPEAR TO BE IN  ANY DISTRESS NOR ANY NEURO DEFICITS PRESENT. RT 
GROIN SOFT WITH NO OOZING OR HEMATOMA PRESENT, DRESSING DRY AND INTACT. DP/PT PULSES 
PALPABLE. PT RESTING COMFORTABLY, CALL LIGHT WITHIN REACH, FAMILY AT BEDSIDE.

## 2019-11-11 NOTE — NUR
DRESSING TO RT GROIN CLEAN, DRY, AND INTACT.  SITE APPEARS SOFT TO PALPATION.  PEDAL PULSES 
PALPABLE

## 2019-11-12 VITALS — SYSTOLIC BLOOD PRESSURE: 124 MMHG | DIASTOLIC BLOOD PRESSURE: 57 MMHG

## 2019-11-12 VITALS — SYSTOLIC BLOOD PRESSURE: 132 MMHG | DIASTOLIC BLOOD PRESSURE: 48 MMHG

## 2019-11-12 VITALS — SYSTOLIC BLOOD PRESSURE: 144 MMHG | DIASTOLIC BLOOD PRESSURE: 54 MMHG

## 2019-11-12 LAB
BUN SERPL-MCNC: 25 MG/DL (ref 7–18)
CHLORIDE SERPL-SCNC: 100 MMOL/L (ref 101–111)
CO2 SERPL-SCNC: 29 MMOL/L (ref 21–32)
CREAT SERPL-MCNC: 1.5 MG/DL (ref 0.5–1.5)
ERYTHROCYTE [DISTWIDTH] IN BLOOD BY AUTOMATED COUNT: 13.6 % (ref 11–15.5)
GFR SERPL CREATININE-BSD FRML MDRD: 36 ML/MIN (ref 60–?)
GLUCOSE SERPL-MCNC: 111 MG/DL (ref 70–105)
HCT VFR BLD AUTO: 28 % (ref 36–48)
MCH RBC QN AUTO: 30.2 PG (ref 27–33)
MCHC RBC AUTO-ENTMCNC: 33.7 G/DL (ref 32–36)
MCV RBC AUTO: 89.7 FL (ref 79–99)
NRBC BLD MANUAL-RTO: 0 % (ref 0–0.19)
PLATELET # BLD AUTO: 227 K/UL (ref 130–400)
POTASSIUM SERPL-SCNC: 3.8 MMOL/L (ref 3.5–5.1)
RBC # BLD AUTO: 3.12 MIL/UL (ref 4–5.5)
SODIUM SERPL-SCNC: 138 MMOL/L (ref 136–145)
WBC # BLD AUTO: 14 K/UL (ref 4.8–10.8)

## 2019-11-12 RX ADMIN — Medication SCH MG: at 08:55

## 2019-11-12 RX ADMIN — Medication SCH EACH: at 08:53

## 2019-11-12 RX ADMIN — CLOPIDOGREL BISULFATE SCH MG: 75 TABLET, FILM COATED ORAL at 08:53

## 2019-11-12 RX ADMIN — FAMOTIDINE SCH MG: 10 INJECTION INTRAVENOUS at 08:53

## 2019-11-12 RX ADMIN — CARVEDILOL SCH MG: 3.12 TABLET, FILM COATED ORAL at 08:54

## 2019-11-12 RX ADMIN — PYRITHIONE ZINC SCH MG: 1 SHAMPOO TOPICAL at 08:53

## 2019-11-12 RX ADMIN — Medication SCH TAB: at 08:53

## 2019-11-12 RX ADMIN — CHLORTHALIDONE SCH EACH: 50 TABLET ORAL at 09:00

## 2019-11-12 RX ADMIN — DIGOXIN SCH MCG: 125 TABLET ORAL at 08:55

## 2019-11-12 RX ADMIN — BUMETANIDE SCH MG: 1 TABLET ORAL at 08:54

## 2019-11-12 RX ADMIN — CHLORTHALIDONE SCH EACH: 50 TABLET ORAL at 14:00

## 2019-11-12 RX ADMIN — Medication SCH GM: at 08:55

## 2019-11-12 NOTE — NUR
stan CALLAHAN RN- patient is pending to be DC today.

-------------------------------------------------------------------------------

Addendum: 11/12/19 at 1105 by JONATHAN PARDO PT PT

-------------------------------------------------------------------------------

Amended: Links added.

## 2019-11-12 NOTE — NUR
ASSESSMENT

ENCOUNTERED PT SITTING ON SIDE OF BED, A&OX3, CALM COOPERATIVE AND DOES NOT APPEAR TO BE IN 
ANY DISTRESS NOR ANY NEURO DEFICITS PRESENT. RT GROIN DRESSING DRY AND INTACT. DP/PT PULSES 
PALPABLE. DR PRUITT AT BEDSIDE, UPDATE GIVEN, ORDERS RECEIVED FOR OK FROM [facility] STAND POINT FOR 
DISCHARGE. CALL LIGHT WITHIN REACH.

## 2019-11-12 NOTE — NUR
DISCHARGE

INSTRUCTIONS GIVEN REGARDING RESUMING XARELTO ON THURSDAY 11/14/19 AS PER DR VASQUEZ'S 
INSTRUCTIONS, TO NOTIFY MD OF ANY BLEEDING, SWELLING OR PAIN TO RT GROIN AND OF ANY BLEEDING 
WITH STOOL, EMESIS AND/OR URINE. PIVS REMOVED AND INTACT, DISCHARGED HOME TO FAMILY VEHICLE 
VIA WHEELCHAIR.

## 2020-12-18 ENCOUNTER — HOSPITAL ENCOUNTER (OUTPATIENT)
Dept: HOSPITAL 90 - DAH | Age: 80
Discharge: HOME | End: 2020-12-18
Attending: INTERNAL MEDICINE
Payer: MEDICARE

## 2020-12-18 DIAGNOSIS — Z01.810: Primary | ICD-10-CM

## 2020-12-18 DIAGNOSIS — Z20.828: ICD-10-CM

## 2020-12-18 DIAGNOSIS — Z79.01: ICD-10-CM

## 2020-12-18 DIAGNOSIS — Z88.8: ICD-10-CM

## 2020-12-18 LAB
APTT PPP: 32 SEC (ref 26.3–35.5)
BASOPHILS NFR BLD AUTO: 0.6 % (ref 0–5)
BUN SERPL-MCNC: 39 MG/DL (ref 7–18)
CHLORIDE SERPL-SCNC: 101 MMOL/L (ref 101–111)
CO2 SERPL-SCNC: 32 MMOL/L (ref 21–32)
CREAT SERPL-MCNC: 1.5 MG/DL (ref 0.5–1.5)
EOSINOPHIL NFR BLD AUTO: 1.3 % (ref 0–8)
ERYTHROCYTE [DISTWIDTH] IN BLOOD BY AUTOMATED COUNT: 12.9 % (ref 11–15.5)
GFR SERPL CREATININE-BSD FRML MDRD: 36 ML/MIN (ref 60–?)
GLUCOSE SERPL-MCNC: 78 MG/DL (ref 70–105)
HCT VFR BLD AUTO: 33.6 % (ref 36–48)
INR PPP: 1.21 (ref 0.85–1.15)
LYMPHOCYTES NFR SPEC AUTO: 17.9 % (ref 21–51)
MCH RBC QN AUTO: 30.4 PG (ref 27–33)
MCHC RBC AUTO-ENTMCNC: 31.5 G/DL (ref 32–36)
MCV RBC AUTO: 96.3 FL (ref 79–99)
MONOCYTES NFR BLD AUTO: 5.4 % (ref 3–13)
NEUTROPHILS NFR BLD AUTO: 74.4 % (ref 40–77)
NRBC BLD MANUAL-RTO: 0 % (ref 0–0.19)
PLATELET # BLD AUTO: 266 K/UL (ref 130–400)
POTASSIUM SERPL-SCNC: 4.1 MMOL/L (ref 3.5–5.1)
PROTHROMBIN TIME: 12.8 SEC (ref 9.6–11.6)
RBC # BLD AUTO: 3.49 MIL/UL (ref 4–5.5)
SODIUM SERPL-SCNC: 141 MMOL/L (ref 136–145)
WBC # BLD AUTO: 9.7 K/UL (ref 4.8–10.8)

## 2020-12-18 PROCEDURE — 85610 PROTHROMBIN TIME: CPT

## 2020-12-18 PROCEDURE — 80048 BASIC METABOLIC PNL TOTAL CA: CPT

## 2020-12-18 PROCEDURE — 85025 COMPLETE CBC W/AUTO DIFF WBC: CPT

## 2020-12-18 PROCEDURE — 36415 COLL VENOUS BLD VENIPUNCTURE: CPT

## 2020-12-18 PROCEDURE — 85730 THROMBOPLASTIN TIME PARTIAL: CPT

## 2020-12-18 PROCEDURE — 93005 ELECTROCARDIOGRAM TRACING: CPT

## 2021-02-10 ENCOUNTER — HOSPITAL ENCOUNTER (EMERGENCY)
Dept: HOSPITAL 90 - EDH | Age: 81
Discharge: HOME | End: 2021-02-10
Payer: MEDICARE

## 2021-02-10 DIAGNOSIS — I10: ICD-10-CM

## 2021-02-10 DIAGNOSIS — E11.9: ICD-10-CM
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
## 2021-02-10 DIAGNOSIS — I25.10: ICD-10-CM

## 2021-02-10 DIAGNOSIS — E78.5: ICD-10-CM
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
## 2021-02-10 DIAGNOSIS — S61.217A: Primary | ICD-10-CM
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
## 2021-02-10 PROCEDURE — 90471 IMMUNIZATION ADMIN: CPT

## 2021-02-10 PROCEDURE — 90714 TD VACC NO PRESV 7 YRS+ IM: CPT

## 2021-02-10 PROCEDURE — 73130 X-RAY EXAM OF HAND: CPT

## 2021-02-10 PROCEDURE — 12001 RPR S/N/AX/GEN/TRNK 2.5CM/<: CPT

## 2021-02-18 ENCOUNTER — HOSPITAL ENCOUNTER (EMERGENCY)
Dept: HOSPITAL 90 - EDH | Age: 81
Discharge: HOME | End: 2021-02-18
Payer: MEDICARE

## 2021-02-18 DIAGNOSIS — I10: ICD-10-CM

## 2021-02-18 DIAGNOSIS — Z88.5: ICD-10-CM

## 2021-02-18 DIAGNOSIS — Z88.1: ICD-10-CM

## 2021-02-18 DIAGNOSIS — Z88.8: ICD-10-CM

## 2021-02-18 DIAGNOSIS — Z88.2: ICD-10-CM

## 2021-02-18 DIAGNOSIS — Z88.0: ICD-10-CM

## 2021-02-18 DIAGNOSIS — E11.9: ICD-10-CM

## 2021-02-18 DIAGNOSIS — I25.10: ICD-10-CM

## 2021-02-18 DIAGNOSIS — Z48.02: Primary | ICD-10-CM

## 2021-02-18 DIAGNOSIS — Z88.6: ICD-10-CM

## 2021-02-18 DIAGNOSIS — E78.5: ICD-10-CM

## 2021-02-18 PROCEDURE — 99281 EMR DPT VST MAYX REQ PHY/QHP: CPT

## 2022-03-29 ENCOUNTER — HOSPITAL ENCOUNTER (OUTPATIENT)
Dept: HOSPITAL 90 - SHCH | Age: 82
Discharge: HOME | End: 2022-03-29
Attending: INTERNAL MEDICINE
Payer: MEDICARE

## 2022-03-29 DIAGNOSIS — I70.293: Primary | ICD-10-CM

## 2022-03-29 PROCEDURE — 93925 LOWER EXTREMITY STUDY: CPT

## 2022-05-20 ENCOUNTER — HOSPITAL ENCOUNTER (OUTPATIENT)
Dept: HOSPITAL 90 - EDH | Age: 82
Setting detail: OBSERVATION
LOS: 1 days | Discharge: HOME | End: 2022-05-21
Attending: INTERNAL MEDICINE | Admitting: INTERNAL MEDICINE
Payer: MEDICARE

## 2022-05-20 VITALS — BODY MASS INDEX: 22.2 KG/M2 | WEIGHT: 130 LBS | HEIGHT: 64 IN

## 2022-05-20 VITALS — DIASTOLIC BLOOD PRESSURE: 58 MMHG | SYSTOLIC BLOOD PRESSURE: 136 MMHG

## 2022-05-20 VITALS — SYSTOLIC BLOOD PRESSURE: 153 MMHG | DIASTOLIC BLOOD PRESSURE: 58 MMHG

## 2022-05-20 VITALS — SYSTOLIC BLOOD PRESSURE: 187 MMHG | DIASTOLIC BLOOD PRESSURE: 69 MMHG

## 2022-05-20 DIAGNOSIS — I10: ICD-10-CM
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
## 2022-05-20 DIAGNOSIS — I25.110: Primary | ICD-10-CM

## 2022-05-20 DIAGNOSIS — I24.9: ICD-10-CM
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
## 2022-05-20 DIAGNOSIS — Z95.0: ICD-10-CM

## 2022-05-20 DIAGNOSIS — E78.5: ICD-10-CM

## 2022-05-20 DIAGNOSIS — E11.9: ICD-10-CM

## 2022-05-20 DIAGNOSIS — I48.20: ICD-10-CM

## 2022-05-20 DIAGNOSIS — Z95.5: ICD-10-CM

## 2022-05-20 DIAGNOSIS — Z79.01: ICD-10-CM

## 2022-05-20 DIAGNOSIS — I35.1: ICD-10-CM

## 2022-05-20 LAB
ALBUMIN SERPL-MCNC: 4.6 G/DL (ref 3.5–5)
ALT SERPL-CCNC: 25 U/L (ref 12–78)
AST SERPL-CCNC: 29 U/L (ref 10–37)
BASOPHILS NFR BLD AUTO: 0.4 % (ref 0–5)
BILIRUB SERPL-MCNC: 0.6 MG/DL (ref 0.2–1)
BUN SERPL-MCNC: 43 MG/DL (ref 7–18)
CHLORIDE SERPL-SCNC: 100 MMOL/L (ref 101–111)
CK SERPL-CCNC: 119 U/L (ref 21–232)
CO2 SERPL-SCNC: 33 MMOL/L (ref 21–32)
CREAT SERPL-MCNC: 1.5 MG/DL (ref 0.5–1.5)
DEPRECATED SQUAMOUS URNS QL MICRO: (no result) /HPF (ref 0–2)
EOSINOPHIL NFR BLD AUTO: 1.3 % (ref 0–8)
ERYTHROCYTE [DISTWIDTH] IN BLOOD BY AUTOMATED COUNT: 11.8 % (ref 11–15.5)
GFR SERPL CREATININE-BSD FRML MDRD: 35 ML/MIN (ref 60–?)
GLUCOSE SERPL-MCNC: 99 MG/DL (ref 70–105)
HCT VFR BLD AUTO: 36.9 % (ref 36–48)
LIPASE SERPL-CCNC: 148 U/L (ref 114–286)
LYMPHOCYTES NFR SPEC AUTO: 11.6 % (ref 21–51)
MCH RBC QN AUTO: 31.8 PG (ref 27–33)
MCHC RBC AUTO-ENTMCNC: 32.8 G/DL (ref 32–36)
MCV RBC AUTO: 97.1 FL (ref 79–99)
MONOCYTES NFR BLD AUTO: 7.7 % (ref 3–13)
NEUTROPHILS NFR BLD AUTO: 78.6 % (ref 40–77)
NRBC BLD MANUAL-RTO: 0 % (ref 0–0.19)
PH UR STRIP: 7 [PH] (ref 5–8)
PLATELET # BLD AUTO: 231 K/UL (ref 130–400)
POTASSIUM SERPL-SCNC: 3.9 MMOL/L (ref 3.5–5.1)
PROT SERPL-MCNC: 8.2 G/DL (ref 6–8.3)
RBC # BLD AUTO: 3.8 MIL/UL (ref 4–5.5)
SODIUM SERPL-SCNC: 142 MMOL/L (ref 136–145)
SP GR UR STRIP: 1 (ref 1–1.03)
UROBILINOGEN UR STRIP-MCNC: 0.2 MG/DL (ref 0.2–1)
WBC # BLD AUTO: 12 K/UL (ref 4.8–10.8)
WBC #/AREA URNS HPF: (no result) /HPF (ref 0–1)

## 2022-05-20 PROCEDURE — 81001 URINALYSIS AUTO W/SCOPE: CPT

## 2022-05-20 PROCEDURE — 84484 ASSAY OF TROPONIN QUANT: CPT

## 2022-05-20 PROCEDURE — 36415 COLL VENOUS BLD VENIPUNCTURE: CPT

## 2022-05-20 PROCEDURE — 82550 ASSAY OF CK (CPK): CPT

## 2022-05-20 PROCEDURE — 99285 EMERGENCY DEPT VISIT HI MDM: CPT

## 2022-05-20 PROCEDURE — 83880 ASSAY OF NATRIURETIC PEPTIDE: CPT

## 2022-05-20 PROCEDURE — 83690 ASSAY OF LIPASE: CPT

## 2022-05-20 PROCEDURE — 71045 X-RAY EXAM CHEST 1 VIEW: CPT

## 2022-05-20 PROCEDURE — 80053 COMPREHEN METABOLIC PANEL: CPT

## 2022-05-20 PROCEDURE — 93005 ELECTROCARDIOGRAM TRACING: CPT

## 2022-05-20 PROCEDURE — 85025 COMPLETE CBC W/AUTO DIFF WBC: CPT

## 2022-05-20 RX ADMIN — NITROGLYCERIN SCH INCH: 20 OINTMENT TOPICAL at 18:14

## 2022-05-21 VITALS — DIASTOLIC BLOOD PRESSURE: 58 MMHG | SYSTOLIC BLOOD PRESSURE: 137 MMHG

## 2022-05-21 VITALS — SYSTOLIC BLOOD PRESSURE: 123 MMHG | DIASTOLIC BLOOD PRESSURE: 57 MMHG

## 2022-05-21 RX ADMIN — NITROGLYCERIN SCH INCH: 20 OINTMENT TOPICAL at 06:24

## 2022-05-21 RX ADMIN — NITROGLYCERIN SCH INCH: 20 OINTMENT TOPICAL at 00:41

## 2022-06-07 ENCOUNTER — HOSPITAL ENCOUNTER (OUTPATIENT)
Dept: HOSPITAL 90 - SHCH | Age: 82
Discharge: HOME | End: 2022-06-07
Attending: INTERNAL MEDICINE
Payer: MEDICARE

## 2022-06-07 DIAGNOSIS — Z95.0: ICD-10-CM

## 2022-06-07 DIAGNOSIS — R07.9: Primary | ICD-10-CM

## 2022-06-07 PROCEDURE — 78452 HT MUSCLE IMAGE SPECT MULT: CPT

## 2022-06-07 PROCEDURE — 93017 CV STRESS TEST TRACING ONLY: CPT

## 2022-06-07 PROCEDURE — 96374 THER/PROPH/DIAG INJ IV PUSH: CPT

## 2022-09-01 ENCOUNTER — HOSPITAL ENCOUNTER (OUTPATIENT)
Dept: HOSPITAL 90 - EDH | Age: 82
Setting detail: OBSERVATION
LOS: 2 days | Discharge: HOME | End: 2022-09-03
Attending: INTERNAL MEDICINE | Admitting: INTERNAL MEDICINE
Payer: MEDICARE

## 2022-09-01 VITALS — HEIGHT: 64 IN | BODY MASS INDEX: 22.13 KG/M2 | WEIGHT: 129.6 LBS

## 2022-09-01 DIAGNOSIS — Z79.899: ICD-10-CM

## 2022-09-01 DIAGNOSIS — Z79.4: ICD-10-CM

## 2022-09-01 DIAGNOSIS — J81.1: ICD-10-CM

## 2022-09-01 DIAGNOSIS — I12.9: ICD-10-CM

## 2022-09-01 DIAGNOSIS — N18.30: ICD-10-CM

## 2022-09-01 DIAGNOSIS — I24.9: Primary | ICD-10-CM

## 2022-09-01 DIAGNOSIS — Z86.73: ICD-10-CM

## 2022-09-01 DIAGNOSIS — Z79.01: ICD-10-CM

## 2022-09-01 DIAGNOSIS — E78.5: ICD-10-CM

## 2022-09-01 DIAGNOSIS — Z95.0: ICD-10-CM

## 2022-09-01 DIAGNOSIS — E11.22: ICD-10-CM

## 2022-09-01 DIAGNOSIS — I35.0: ICD-10-CM

## 2022-09-01 DIAGNOSIS — I25.10: ICD-10-CM

## 2022-09-01 DIAGNOSIS — I48.20: ICD-10-CM

## 2022-09-01 DIAGNOSIS — Z79.811: ICD-10-CM

## 2022-09-01 DIAGNOSIS — Z95.1: ICD-10-CM

## 2022-09-01 LAB
ALBUMIN SERPL-MCNC: 4 G/DL (ref 3.5–5)
ALT SERPL-CCNC: 19 U/L (ref 12–78)
APPEARANCE UR: CLEAR
AST SERPL-CCNC: 11 U/L (ref 10–37)
BASOPHILS NFR BLD AUTO: 0.5 % (ref 0–5)
BILIRUB UR QL STRIP: NEGATIVE
BNP SERPL-MCNC: 158 PG/ML (ref 0–100)
BUN SERPL-MCNC: 51 MG/DL (ref 7–18)
CHLORIDE SERPL-SCNC: 100 MMOL/L (ref 101–111)
CK SERPL-CCNC: 58 U/L (ref 21–232)
CO2 SERPL-SCNC: 34 MMOL/L (ref 21–32)
COLOR UR: YELLOW
CREAT SERPL-MCNC: 1.5 MG/DL (ref 0.5–1.5)
EOSINOPHIL NFR BLD AUTO: 2.7 % (ref 0–8)
ERYTHROCYTE [DISTWIDTH] IN BLOOD BY AUTOMATED COUNT: 12.4 % (ref 11–15.5)
GFR SERPL CREATININE-BSD FRML MDRD: 35 ML/MIN (ref 60–?)
GLUCOSE SERPL-MCNC: 105 MG/DL (ref 70–105)
GLUCOSE UR STRIP-MCNC: NEGATIVE MG/DL
HCT VFR BLD AUTO: 34.5 % (ref 36–48)
HGB UR QL STRIP: NEGATIVE
KETONES UR STRIP-MCNC: NEGATIVE MG/DL
LEUKOCYTE ESTERASE UR QL STRIP: (no result)
LYMPHOCYTES NFR SPEC AUTO: 15.8 % (ref 21–51)
MAGNESIUM SERPL-MCNC: 2.3 MG/DL (ref 1.8–2.4)
MCH RBC QN AUTO: 31.1 PG (ref 27–33)
MCHC RBC AUTO-ENTMCNC: 32.5 G/DL (ref 32–36)
MCV RBC AUTO: 95.8 FL (ref 79–99)
MONOCYTES NFR BLD AUTO: 7.1 % (ref 3–13)
MYOGLOBIN SERPL-MCNC: 65 NG/ML (ref 10–92)
NEUTROPHILS NFR BLD AUTO: 73.6 % (ref 40–77)
NITRITE UR QL STRIP: NEGATIVE
NRBC BLD MANUAL-RTO: 0 % (ref 0–0.19)
PH UR STRIP: 6 [PH] (ref 5–8)
PLATELET # BLD AUTO: 214 K/UL (ref 130–400)
POTASSIUM SERPL-SCNC: 3.9 MMOL/L (ref 3.5–5.1)
PROT SERPL-MCNC: 7.5 G/DL (ref 6–8.3)
PROT UR QL STRIP: NEGATIVE MG/DL
RBC # BLD AUTO: 3.6 MIL/UL (ref 4–5.5)
RBC #/AREA URNS HPF: (no result) /HPF (ref 0–1)
SODIUM SERPL-SCNC: 140 MMOL/L (ref 136–145)
SP GR UR STRIP: 1.01 (ref 1–1.03)
UROBILINOGEN UR STRIP-MCNC: 0.2 MG/DL (ref 0.2–1)
WBC # BLD AUTO: 9.9 K/UL (ref 4.8–10.8)
WBC #/AREA URNS HPF: (no result) /HPF (ref 0–1)

## 2022-09-01 PROCEDURE — 83735 ASSAY OF MAGNESIUM: CPT

## 2022-09-01 PROCEDURE — 81001 URINALYSIS AUTO W/SCOPE: CPT

## 2022-09-01 PROCEDURE — 36415 COLL VENOUS BLD VENIPUNCTURE: CPT

## 2022-09-01 PROCEDURE — 82948 REAGENT STRIP/BLOOD GLUCOSE: CPT

## 2022-09-01 PROCEDURE — 84484 ASSAY OF TROPONIN QUANT: CPT

## 2022-09-01 PROCEDURE — 99285 EMERGENCY DEPT VISIT HI MDM: CPT

## 2022-09-01 PROCEDURE — 93356 MYOCRD STRAIN IMG SPCKL TRCK: CPT

## 2022-09-01 PROCEDURE — 83880 ASSAY OF NATRIURETIC PEPTIDE: CPT

## 2022-09-01 PROCEDURE — 71045 X-RAY EXAM CHEST 1 VIEW: CPT

## 2022-09-01 PROCEDURE — 93306 TTE W/DOPPLER COMPLETE: CPT

## 2022-09-01 PROCEDURE — 80053 COMPREHEN METABOLIC PANEL: CPT

## 2022-09-01 PROCEDURE — 85027 COMPLETE CBC AUTOMATED: CPT

## 2022-09-01 PROCEDURE — 93005 ELECTROCARDIOGRAM TRACING: CPT

## 2022-09-01 PROCEDURE — 82550 ASSAY OF CK (CPK): CPT

## 2022-09-01 PROCEDURE — 85025 COMPLETE CBC W/AUTO DIFF WBC: CPT

## 2022-09-01 PROCEDURE — 83874 ASSAY OF MYOGLOBIN: CPT

## 2022-09-02 VITALS — DIASTOLIC BLOOD PRESSURE: 55 MMHG | SYSTOLIC BLOOD PRESSURE: 158 MMHG

## 2022-09-02 VITALS — DIASTOLIC BLOOD PRESSURE: 59 MMHG | SYSTOLIC BLOOD PRESSURE: 137 MMHG

## 2022-09-02 VITALS — DIASTOLIC BLOOD PRESSURE: 55 MMHG | SYSTOLIC BLOOD PRESSURE: 155 MMHG

## 2022-09-02 VITALS — SYSTOLIC BLOOD PRESSURE: 110 MMHG | DIASTOLIC BLOOD PRESSURE: 50 MMHG

## 2022-09-02 VITALS — DIASTOLIC BLOOD PRESSURE: 50 MMHG | SYSTOLIC BLOOD PRESSURE: 146 MMHG

## 2022-09-02 LAB
ALBUMIN SERPL-MCNC: 3.6 G/DL (ref 3.5–5)
ALT SERPL-CCNC: 18 U/L (ref 12–78)
AST SERPL-CCNC: 16 U/L (ref 10–37)
BUN SERPL-MCNC: 43 MG/DL (ref 7–18)
CHLORIDE SERPL-SCNC: 103 MMOL/L (ref 101–111)
CK SERPL-CCNC: 54 U/L (ref 21–232)
CK SERPL-CCNC: 55 U/L (ref 21–232)
CO2 SERPL-SCNC: 34 MMOL/L (ref 21–32)
CREAT SERPL-MCNC: 1.4 MG/DL (ref 0.5–1.5)
ERYTHROCYTE [DISTWIDTH] IN BLOOD BY AUTOMATED COUNT: 12.3 % (ref 11–15.5)
GFR SERPL CREATININE-BSD FRML MDRD: 38 ML/MIN (ref 60–?)
GLUCOSE SERPL-MCNC: 117 MG/DL (ref 70–105)
HCT VFR BLD AUTO: 33.5 % (ref 36–48)
MCH RBC QN AUTO: 30.9 PG (ref 27–33)
MCHC RBC AUTO-ENTMCNC: 32.2 G/DL (ref 32–36)
MCV RBC AUTO: 95.7 FL (ref 79–99)
MYOGLOBIN SERPL-MCNC: 58 NG/ML (ref 10–92)
MYOGLOBIN SERPL-MCNC: 80 NG/ML (ref 10–92)
NRBC BLD MANUAL-RTO: 0 % (ref 0–0.19)
PLATELET # BLD AUTO: 200 K/UL (ref 130–400)
POTASSIUM SERPL-SCNC: 3.8 MMOL/L (ref 3.5–5.1)
PROT SERPL-MCNC: 6.8 G/DL (ref 6–8.3)
RBC # BLD AUTO: 3.5 MIL/UL (ref 4–5.5)
SODIUM SERPL-SCNC: 141 MMOL/L (ref 136–145)
WBC # BLD AUTO: 10.6 K/UL (ref 4.8–10.8)

## 2022-09-02 RX ADMIN — BISACODYL SCH MG: 5 TABLET, COATED ORAL at 10:00

## 2022-09-02 RX ADMIN — SODIUM CHLORIDE SCH UNIT: 9 INJECTION, SOLUTION INTRAVENOUS at 21:00

## 2022-09-02 RX ADMIN — CHLORTHALIDONE SCH EACH: 50 TABLET ORAL at 09:00

## 2022-09-02 RX ADMIN — ASCORBIC ACID, FOLIC ACID, NIACIN, THIAMINE, RIBOFLAVIN, PYRIDOXINE, CYANOCOBALAMIN, PANTOTHENIC ACID, BIOTIN SCH CAP: 100; 150; 6; 1; 20; 5; 10; 1.7; 1.5 CAPSULE, LIQUID FILLED ORAL at 09:52

## 2022-09-02 RX ADMIN — Medication SCH MG: at 21:37

## 2022-09-02 RX ADMIN — CHLORTHALIDONE SCH EACH: 50 TABLET ORAL at 21:44

## 2022-09-02 RX ADMIN — RIVAROXABAN SCH MG: 15 TABLET, FILM COATED ORAL at 10:00

## 2022-09-02 RX ADMIN — BUMETANIDE SCH MG: 1 TABLET ORAL at 11:50

## 2022-09-02 RX ADMIN — SODIUM CHLORIDE SCH UNIT: 9 INJECTION, SOLUTION INTRAVENOUS at 21:39

## 2022-09-02 RX ADMIN — DIGOXIN SCH MCG: 125 TABLET ORAL at 10:07

## 2022-09-02 RX ADMIN — CHLORTHALIDONE SCH EACH: 50 TABLET ORAL at 21:45

## 2022-09-02 RX ADMIN — BUMETANIDE SCH MG: 1 TABLET ORAL at 10:12

## 2022-09-02 RX ADMIN — Medication SCH MG: at 09:59

## 2022-09-02 RX ADMIN — SODIUM CHLORIDE SCH UNIT: 9 INJECTION, SOLUTION INTRAVENOUS at 09:00

## 2022-09-02 RX ADMIN — OXYCODONE HYDROCHLORIDE AND ACETAMINOPHEN SCH MG: 500 TABLET ORAL at 09:57

## 2022-09-02 RX ADMIN — Medication SCH CAP: at 09:59

## 2022-09-02 RX ADMIN — CHLORTHALIDONE SCH EACH: 50 TABLET ORAL at 17:00

## 2022-09-02 RX ADMIN — CARVEDILOL SCH MG: 6.25 TABLET, FILM COATED ORAL at 10:06

## 2022-09-02 RX ADMIN — Medication SCH MCG: at 09:53

## 2022-09-02 RX ADMIN — CHLORTHALIDONE SCH EACH: 50 TABLET ORAL at 21:00

## 2022-09-02 RX ADMIN — CARVEDILOL SCH MG: 6.25 TABLET, FILM COATED ORAL at 21:38

## 2022-09-02 RX ADMIN — PYRITHIONE ZINC SCH MG: 1 SHAMPOO TOPICAL at 10:01

## 2022-09-03 VITALS — DIASTOLIC BLOOD PRESSURE: 55 MMHG | SYSTOLIC BLOOD PRESSURE: 123 MMHG

## 2022-09-03 VITALS — SYSTOLIC BLOOD PRESSURE: 139 MMHG | DIASTOLIC BLOOD PRESSURE: 59 MMHG

## 2022-09-03 VITALS — SYSTOLIC BLOOD PRESSURE: 123 MMHG | DIASTOLIC BLOOD PRESSURE: 57 MMHG

## 2022-09-03 VITALS — SYSTOLIC BLOOD PRESSURE: 138 MMHG | DIASTOLIC BLOOD PRESSURE: 55 MMHG

## 2022-09-03 VITALS — DIASTOLIC BLOOD PRESSURE: 55 MMHG | SYSTOLIC BLOOD PRESSURE: 103 MMHG

## 2022-09-03 RX ADMIN — Medication SCH CAP: at 09:01

## 2022-09-03 RX ADMIN — CARVEDILOL SCH MG: 6.25 TABLET, FILM COATED ORAL at 09:13

## 2022-09-03 RX ADMIN — ASCORBIC ACID, FOLIC ACID, NIACIN, THIAMINE, RIBOFLAVIN, PYRIDOXINE, CYANOCOBALAMIN, PANTOTHENIC ACID, BIOTIN SCH CAP: 100; 150; 6; 1; 20; 5; 10; 1.7; 1.5 CAPSULE, LIQUID FILLED ORAL at 09:05

## 2022-09-03 RX ADMIN — PYRITHIONE ZINC SCH MG: 1 SHAMPOO TOPICAL at 09:04

## 2022-09-03 RX ADMIN — Medication SCH MCG: at 09:02

## 2022-09-03 RX ADMIN — SODIUM CHLORIDE SCH UNIT: 9 INJECTION, SOLUTION INTRAVENOUS at 09:00

## 2022-09-03 RX ADMIN — RIVAROXABAN SCH MG: 15 TABLET, FILM COATED ORAL at 09:05

## 2022-09-03 RX ADMIN — DIGOXIN SCH MCG: 125 TABLET ORAL at 09:11

## 2022-09-03 RX ADMIN — CHLORTHALIDONE SCH EACH: 50 TABLET ORAL at 17:00

## 2022-09-03 RX ADMIN — BUMETANIDE SCH MG: 1 TABLET ORAL at 11:34

## 2022-09-03 RX ADMIN — OXYCODONE HYDROCHLORIDE AND ACETAMINOPHEN SCH MG: 500 TABLET ORAL at 09:00

## 2022-09-03 RX ADMIN — BUMETANIDE SCH MG: 1 TABLET ORAL at 09:09

## 2022-09-03 RX ADMIN — CHLORTHALIDONE SCH EACH: 50 TABLET ORAL at 09:00

## 2022-09-03 RX ADMIN — BISACODYL SCH MG: 5 TABLET, COATED ORAL at 09:01

## 2022-09-03 RX ADMIN — Medication SCH MG: at 09:05

## 2024-08-15 ENCOUNTER — HOSPITAL ENCOUNTER (OUTPATIENT)
Dept: HOSPITAL 90 - SHCH | Age: 84
Discharge: HOME | End: 2024-08-15
Attending: INTERNAL MEDICINE
Payer: MEDICARE

## 2024-08-15 DIAGNOSIS — I73.9: Primary | ICD-10-CM

## 2024-08-15 PROCEDURE — 93925 LOWER EXTREMITY STUDY: CPT

## 2025-01-06 ENCOUNTER — HOSPITAL ENCOUNTER (OUTPATIENT)
Dept: HOSPITAL 90 - LAB | Age: 85
Discharge: HOME | End: 2025-01-06
Attending: INTERNAL MEDICINE
Payer: MEDICARE

## 2025-01-06 DIAGNOSIS — I50.32: ICD-10-CM

## 2025-01-06 DIAGNOSIS — I48.0: Primary | ICD-10-CM

## 2025-01-06 LAB
BASOPHILS # BLD AUTO: 0.08 K/UL (ref 0–0.2)
BASOPHILS NFR BLD AUTO: 0.8 % (ref 0–5)
EOSINOPHIL # BLD AUTO: 0.13 K/UL (ref 0–0.7)
EOSINOPHIL NFR BLD AUTO: 1.3 % (ref 0–8)
ERYTHROCYTE [DISTWIDTH] IN BLOOD BY AUTOMATED COUNT: 12.9 % (ref 11–15.5)
HCT VFR BLD AUTO: 36 % (ref 36–48)
IMM GRANULOCYTES # BLD: 0.04 K/UL (ref 0–1)
INR PPP: 1 (ref 0.85–1.15)
LYMPHOCYTES # SPEC AUTO: 1.6 K/UL (ref 1–4.8)
LYMPHOCYTES NFR SPEC AUTO: 16.2 % (ref 21–51)
MCH RBC QN AUTO: 32.2 PG (ref 27–33)
MCHC RBC AUTO-ENTMCNC: 31.7 G/DL (ref 32–36)
MCV RBC AUTO: 101.7 FL (ref 79–99)
MONOCYTES # BLD AUTO: 0.8 K/UL (ref 0.1–1)
MONOCYTES NFR BLD AUTO: 8.1 % (ref 3–13)
NEUTROPHILS # BLD AUTO: 7.1 K/UL (ref 1.8–7.7)
NEUTROPHILS NFR BLD AUTO: 73.2 % (ref 40–77)
NRBC BLD MANUAL-RTO: 0 % (ref 0–0.19)
PLATELET # BLD AUTO: 183 K/UL (ref 130–400)
PROTHROMBIN TIME: 11.2 SEC (ref 9.6–11.6)
RBC # BLD AUTO: 3.54 MIL/UL (ref 4–5.5)
WBC # BLD AUTO: 9.7 K/UL (ref 4.8–10.8)

## 2025-01-06 PROCEDURE — 85610 PROTHROMBIN TIME: CPT

## 2025-01-06 PROCEDURE — 36415 COLL VENOUS BLD VENIPUNCTURE: CPT

## 2025-01-06 PROCEDURE — 85025 COMPLETE CBC W/AUTO DIFF WBC: CPT

## (undated) PROCEDURE — B2151ZZ FLUOROSCOPY OF LEFT HEART USING LOW OSMOLAR CONTRAST: ICD-10-PCS

## (undated) PROCEDURE — 02703DZ DILATION OF CORONARY ARTERY, ONE ARTERY WITH INTRALUMINAL DEVICE, PERCUTANEOUS APPROACH: ICD-10-PCS

## (undated) PROCEDURE — B2111ZZ FLUOROSCOPY OF MULTIPLE CORONARY ARTERIES USING LOW OSMOLAR CONTRAST: ICD-10-PCS

## (undated) PROCEDURE — B2131ZZ FLUOROSCOPY OF MULTIPLE CORONARY ARTERY BYPASS GRAFTS USING LOW OSMOLAR CONTRAST: ICD-10-PCS